# Patient Record
(demographics unavailable — no encounter records)

---

## 2025-03-24 NOTE — DISCUSSION/SUMMARY
[de-identified] : At this time, I recommended ice and elevation for the right shoulder osteoarthritis.  He was advised to come back next week for the left shoulder osteoarthritis.

## 2025-03-24 NOTE — HISTORY OF PRESENT ILLNESS
[de-identified] : The patient comes in today for his bilateral shoulders.  He states both shoulders have been bothering him for quite some time.  He states he was having good range of motion and then all of a sudden, both shoulders were bothering him.  This injury is not work related or due to an automobile accident.  The patient describes the pain as stabbing.

## 2025-03-24 NOTE — PHYSICAL EXAM
[Normal] : Gait: normal [de-identified] :   Right Shoulder:  Good range of motion, but he has some limited internal and external rotation, and some weakness with internal and external rotation.  Diffuse crepitations and tenderness throughout range of motion.  Pain and swelling throughout range of motion, more significant at extremes.  Negative crank test.  Negative [place]'s test.  Negative Speeds test.  Negative Yergason's test.  Negative cross arm test.  No tenderness to palpation at the AC joint. Negative Folwer sign.  Negative Neers sign.  Negative apprehension. Negative sulcus sign.  No gross neurological or vascular deficits distally.  Skin is intact.  No rashes, scars or lesions.  2+ radial and ulnar pulses. No extra-articular swelling or tenderness.   Left Shoulder:  Good range of motion, but he has some limited internal and external rotation, and some weakness with internal and external rotation.  Diffuse crepitations and tenderness throughout range of motion.  Pain and swelling throughout range of motion, more significant at extremes.  Negative crank test.  Negative [place]'s test.  Negative Speeds test.  Negative Yergason's test.  Negative cross arm test.  No tenderness to palpation at the AC joint. Negative Fowler sign.  Negative Neers sign.  Negative apprehension. Negative sulcus sign.  No gross neurological or vascular deficits distally.  Skin is intact.  No rashes, scars or lesions.  2+ radial and ulnar pulses. No extra-articular swelling or tenderness.  [de-identified] : Appearance:  Well-developed, well-nourished male in no acute distress.   [de-identified] : Radiographs, two views of the right shoulder taken in the office today, reveal osteoarthritis. Radiographs, two views of the left shoulder taken in the office today, reveal osteoarthritis.

## 2025-03-24 NOTE — PROCEDURE
[de-identified] : Indication: Osteoarthritis of right shoulder   Consent: At this time, I have recommended an injection to the right shoulder.  The risks and benefits of the procedure were discussed with the patient in detail.  Upon verbal consent of the patient, we proceeded with the injection as noted below.   Description of Procedure: After a sterile prep, the patient underwent an injection of approximately 9 mL of 1% Lidocaine (10 mg/mL) without epinephrine and 1 mL of triamcinolone acetonide (40 mg/mL) into the right shoulder.  The patient tolerated the procedure well.  There were no complications.   : [employer] Drug Name: Triamcinolone Acetonide Injectable Suspension USP NDC#: [address]-5 Lot#:  [id number] Expiration Date: 08/31/25

## 2025-03-24 NOTE — ADDENDUM
[FreeTextEntry1] : This note was written by Christo Khan on 03/24/2025, acting as a scribe for ANDRAE ROUSSEAU, REMIGIO/NADIA, PA

## 2025-05-05 NOTE — PHYSICAL EXAM
[FreeTextEntry3] :  AAOx3, pleasant, NAD, no visual lymphadenopathy hair, scalp, face, nose, eyelids, ears, lips, oropharynx, neck, chest, abdomen, back, right arm, left arm, nails, and hands examined with all normal findings, pertinent findings include:   multiple benign nevi and lentigines. + inflamed, waxy, keratotic papule on right cheek right posterior scalp inflamed cystic nodule

## 2025-05-05 NOTE — ASSESSMENT
[FreeTextEntry1] : 1) skin check -benign findings  2) ISK The specified lesions were treated with liquid nitrogen cryotherapy.  Discussed risks including pain, blistering, crusting, discoloration, recurrence.  3) right posterior scalp inflamed EIC Risks and benefits were discussed including including atrophy, discoloration Intralesional triamcinolone, concentration 2.5  mg/cc; Total volume    0.1  cc Sites: 1

## 2025-05-05 NOTE — HISTORY OF PRESENT ILLNESS
[FreeTextEntry1] : skin check [de-identified] : 72 y/o male here for skin check. growth on right cheek and right posterior scalp.